# Patient Record
Sex: MALE | Race: WHITE | NOT HISPANIC OR LATINO | Employment: FULL TIME | URBAN - NONMETROPOLITAN AREA
[De-identification: names, ages, dates, MRNs, and addresses within clinical notes are randomized per-mention and may not be internally consistent; named-entity substitution may affect disease eponyms.]

---

## 2018-05-09 ENCOUNTER — APPOINTMENT (OUTPATIENT)
Dept: GENERAL RADIOLOGY | Facility: HOSPITAL | Age: 63
End: 2018-05-09

## 2018-05-09 ENCOUNTER — HOSPITAL ENCOUNTER (EMERGENCY)
Facility: HOSPITAL | Age: 63
Discharge: HOME | End: 2018-05-09
Attending: EMERGENCY MEDICINE | Admitting: EMERGENCY MEDICINE

## 2018-05-09 VITALS
BODY MASS INDEX: 32.93 KG/M2 | HEART RATE: 67 BPM | RESPIRATION RATE: 18 BRPM | SYSTOLIC BLOOD PRESSURE: 151 MMHG | HEIGHT: 70 IN | WEIGHT: 230 LBS | OXYGEN SATURATION: 96 % | DIASTOLIC BLOOD PRESSURE: 74 MMHG | TEMPERATURE: 98.2 F

## 2018-05-09 DIAGNOSIS — R07.9 CHEST PAIN, UNSPECIFIED TYPE: Primary | ICD-10-CM

## 2018-05-09 LAB
ALBUMIN SERPL-MCNC: 4.8 G/DL (ref 3.5–5)
ALBUMIN/GLOB SERPL: 1.4 G/DL (ref 1–2)
ALP SERPL-CCNC: 94 U/L (ref 38–126)
ALT SERPL W P-5'-P-CCNC: 53 U/L (ref 13–69)
ANION GAP SERPL CALCULATED.3IONS-SCNC: 18.4 MMOL/L (ref 10–20)
AST SERPL-CCNC: 33 U/L (ref 15–46)
BASOPHILS # BLD AUTO: 0.04 10*3/MM3 (ref 0–0.2)
BASOPHILS NFR BLD AUTO: 0.4 % (ref 0–2.5)
BILIRUB SERPL-MCNC: 0.5 MG/DL (ref 0.2–1.3)
BUN BLD-MCNC: 14 MG/DL (ref 7–20)
BUN/CREAT SERPL: 15.6 (ref 6.3–21.9)
CALCIUM SPEC-SCNC: 9.4 MG/DL (ref 8.4–10.2)
CHLORIDE SERPL-SCNC: 105 MMOL/L (ref 98–107)
CO2 SERPL-SCNC: 25 MMOL/L (ref 26–30)
CREAT BLD-MCNC: 0.9 MG/DL (ref 0.6–1.3)
D-DIMER, QUANTITATIVE (MAD,POW, STR): 482 NG/ML (FEU) (ref 0–500)
DEPRECATED RDW RBC AUTO: 42.8 FL (ref 37–54)
EOSINOPHIL # BLD AUTO: 0.16 10*3/MM3 (ref 0–0.7)
EOSINOPHIL NFR BLD AUTO: 1.7 % (ref 0–7)
ERYTHROCYTE [DISTWIDTH] IN BLOOD BY AUTOMATED COUNT: 13 % (ref 11.5–14.5)
GFR SERPL CREATININE-BSD FRML MDRD: 85 ML/MIN/1.73
GLOBULIN UR ELPH-MCNC: 3.4 GM/DL
GLUCOSE BLD-MCNC: 124 MG/DL (ref 74–98)
HCT VFR BLD AUTO: 45.2 % (ref 42–52)
HGB BLD-MCNC: 15.4 G/DL (ref 14–18)
HOLD SPECIMEN: NORMAL
HOLD SPECIMEN: NORMAL
IMM GRANULOCYTES # BLD: 0.05 10*3/MM3 (ref 0–0.06)
IMM GRANULOCYTES NFR BLD: 0.5 % (ref 0–0.6)
LIPASE SERPL-CCNC: 99 U/L (ref 23–300)
LYMPHOCYTES # BLD AUTO: 2.4 10*3/MM3 (ref 0.6–3.4)
LYMPHOCYTES NFR BLD AUTO: 26.1 % (ref 10–50)
MCH RBC QN AUTO: 30.9 PG (ref 27–31)
MCHC RBC AUTO-ENTMCNC: 34.1 G/DL (ref 30–37)
MCV RBC AUTO: 90.6 FL (ref 80–94)
MONOCYTES # BLD AUTO: 0.83 10*3/MM3 (ref 0–0.9)
MONOCYTES NFR BLD AUTO: 9 % (ref 0–12)
NEUTROPHILS # BLD AUTO: 5.73 10*3/MM3 (ref 2–6.9)
NEUTROPHILS NFR BLD AUTO: 62.3 % (ref 37–80)
NRBC BLD MANUAL-RTO: 0 /100 WBC (ref 0–0)
PLATELET # BLD AUTO: 208 10*3/MM3 (ref 130–400)
PMV BLD AUTO: 10.3 FL (ref 6–12)
POTASSIUM BLD-SCNC: 3.4 MMOL/L (ref 3.5–5.1)
PROT SERPL-MCNC: 8.2 G/DL (ref 6.3–8.2)
RBC # BLD AUTO: 4.99 10*6/MM3 (ref 4.7–6.1)
SODIUM BLD-SCNC: 145 MMOL/L (ref 137–145)
TROPONIN I SERPL-MCNC: 0.02 NG/ML (ref 0–0.03)
TROPONIN I SERPL-MCNC: <0.012 NG/ML (ref 0–0.03)
WBC NRBC COR # BLD: 9.21 10*3/MM3 (ref 4.8–10.8)
WHOLE BLOOD HOLD SPECIMEN: NORMAL
WHOLE BLOOD HOLD SPECIMEN: NORMAL

## 2018-05-09 PROCEDURE — 93005 ELECTROCARDIOGRAM TRACING: CPT | Performed by: EMERGENCY MEDICINE

## 2018-05-09 PROCEDURE — 83690 ASSAY OF LIPASE: CPT | Performed by: EMERGENCY MEDICINE

## 2018-05-09 PROCEDURE — 84484 ASSAY OF TROPONIN QUANT: CPT | Performed by: EMERGENCY MEDICINE

## 2018-05-09 PROCEDURE — 93005 ELECTROCARDIOGRAM TRACING: CPT

## 2018-05-09 PROCEDURE — 80053 COMPREHEN METABOLIC PANEL: CPT | Performed by: EMERGENCY MEDICINE

## 2018-05-09 PROCEDURE — 71045 X-RAY EXAM CHEST 1 VIEW: CPT

## 2018-05-09 PROCEDURE — 85025 COMPLETE CBC W/AUTO DIFF WBC: CPT | Performed by: EMERGENCY MEDICINE

## 2018-05-09 PROCEDURE — 85379 FIBRIN DEGRADATION QUANT: CPT | Performed by: EMERGENCY MEDICINE

## 2018-05-09 PROCEDURE — 99284 EMERGENCY DEPT VISIT MOD MDM: CPT

## 2018-05-09 RX ORDER — SODIUM CHLORIDE 0.9 % (FLUSH) 0.9 %
10 SYRINGE (ML) INJECTION AS NEEDED
Status: DISCONTINUED | OUTPATIENT
Start: 2018-05-09 | End: 2018-05-09 | Stop reason: HOSPADM

## 2018-05-09 NOTE — ED PROVIDER NOTES
Subjective   62-year-old male presents emergency Department with 2 week history of intermittent nausea and right chest tightness.  Patient states that he has a history of hypertension was started on a new blood pressure medication.  Patient states that he usually gets these symptoms after starting the medication.  Patient's wife states that he's had his symptoms intermittently throughout the day.  She does not believe there is a correlation between taking the medication and symptom onset.  Patient has followed up with his primary care physician and was told that it's he had a heart murmur and would need follow-up with a cardiologist.  Patient denies any cardiac history.  He denies a history of coronary artery disease.  Patient has not had a stress test. Hx of hypertension and hyperlipidemia.         History provided by:  Patient   used: No    Chest Pain   Pain location:  R chest  Pain quality: pressure    Pain radiates to:  Does not radiate  Pain severity:  Mild  Onset quality:  Gradual  Duration:  2 weeks  Timing:  Intermittent  Progression:  Waxing and waning  Chronicity:  New  Context: at rest    Relieved by:  Nothing  Worsened by:  Nothing  Ineffective treatments:  None tried  Associated symptoms: no abdominal pain, no back pain, no cough, no dizziness, no dysphagia, no fever, no nausea, no numbness, no palpitations, no shortness of breath, no vomiting and no weakness    Risk factors: high cholesterol and hypertension    Risk factors: no coronary artery disease and no diabetes mellitus        Review of Systems   Constitutional: Negative for chills and fever.   HENT: Negative for congestion, rhinorrhea, sore throat and trouble swallowing.    Eyes: Negative for discharge and visual disturbance.   Respiratory: Negative for cough, chest tightness, shortness of breath and wheezing.    Cardiovascular: Positive for chest pain. Negative for palpitations and leg swelling.   Gastrointestinal: Negative  for abdominal pain, constipation, diarrhea, nausea and vomiting.   Genitourinary: Negative for dysuria, flank pain and hematuria.   Musculoskeletal: Negative for back pain, myalgias and neck pain.   Skin: Negative for color change and rash.   Neurological: Negative for dizziness, weakness and numbness.   Psychiatric/Behavioral: Negative for self-injury and suicidal ideas.       Past Medical History:   Diagnosis Date   • Cancer    • Hyperlipidemia    • Hypertension        No Known Allergies    Past Surgical History:   Procedure Laterality Date   • PROSTATECTOMY         History reviewed. No pertinent family history.    Social History     Social History   • Marital status:      Social History Main Topics   • Smoking status: Never Smoker   • Alcohol use No   • Drug use: No     Other Topics Concern   • Not on file           Objective   Physical Exam   Constitutional: He is oriented to person, place, and time. He appears well-developed and well-nourished.   HENT:   Head: Normocephalic and atraumatic.   Nose: Nose normal.   Mouth/Throat: Oropharynx is clear and moist.   Eyes: Conjunctivae and EOM are normal. Pupils are equal, round, and reactive to light.   Neck: Normal range of motion. Neck supple.   Cardiovascular: Normal rate, regular rhythm, normal heart sounds and intact distal pulses.    Pulmonary/Chest: Effort normal and breath sounds normal. No respiratory distress. He has no wheezes. He exhibits no tenderness.   Abdominal: Soft. Bowel sounds are normal. There is no tenderness. There is no rebound and no guarding.   Musculoskeletal: Normal range of motion. He exhibits no edema, tenderness or deformity.   Neurological: He is alert and oriented to person, place, and time. No cranial nerve deficit. Coordination normal.   Skin: Skin is warm and dry. No rash noted. No erythema. No pallor.   Psychiatric: He has a normal mood and affect. His behavior is normal. Judgment and thought content normal.   Nursing note  and vitals reviewed.      Procedures           ED Course  ED Course                  MDM  Number of Diagnoses or Management Options  Chest pain, unspecified type:   Diagnosis management comments: EKG: Ventricular rate 86, WV interval 186, QTc 449, QRS duration 138, sinus rhythm, right bundle branch block, ST depression  EKG: Ventricular rate 70, WV interval 186, QTc 466, castration 138, sinus rhythm.  Right bundle branch block.    Troponin, EKG and d-dimer normal. CXR does not show an acute process. Discussed results with the patient. His family want him admitted to see a cardiologist as his PCP has recommended that he see one. Currently asymptomatic.     Spoke with Dr. Rubio, cardiology. He feels the patient can be discharged home and follow up in the office. He will arrange an appointment and have the patient get a stress test during that time. I discussed plan of care with the patient and family. They're not happy the patient is being discharge home. Earliest appointment with Dr. Rubio is 5/17/18.  I explained to them to return if the patient begins having chest pain again and to keep the appointment scheduled with Dr. Rubio.        Amount and/or Complexity of Data Reviewed  Clinical lab tests: reviewed  Tests in the radiology section of CPT®: reviewed          Final diagnoses:   Chest pain, unspecified type            Svitlana Sadler MD  05/09/18 [id number]

## 2018-07-17 ENCOUNTER — TRANSCRIBE ORDERS (OUTPATIENT)
Dept: ADMINISTRATIVE | Facility: HOSPITAL | Age: 63
End: 2018-07-17

## 2018-07-17 ENCOUNTER — APPOINTMENT (OUTPATIENT)
Dept: LAB | Facility: HOSPITAL | Age: 63
End: 2018-07-17

## 2018-07-17 DIAGNOSIS — M79.675 GREAT TOE PAIN, LEFT: Primary | ICD-10-CM

## 2018-07-17 LAB
BASOPHILS # BLD AUTO: 0.04 10*3/MM3 (ref 0–0.2)
BASOPHILS NFR BLD AUTO: 0.6 % (ref 0–2.5)
DEPRECATED RDW RBC AUTO: 41.6 FL (ref 37–54)
EOSINOPHIL # BLD AUTO: 0.18 10*3/MM3 (ref 0–0.7)
EOSINOPHIL NFR BLD AUTO: 2.6 % (ref 0–7)
ERYTHROCYTE [DISTWIDTH] IN BLOOD BY AUTOMATED COUNT: 12.7 % (ref 11.5–14.5)
HCT VFR BLD AUTO: 42.6 % (ref 42–52)
HGB BLD-MCNC: 14.4 G/DL (ref 14–18)
IMM GRANULOCYTES # BLD: 0.02 10*3/MM3 (ref 0–0.06)
IMM GRANULOCYTES NFR BLD: 0.3 % (ref 0–0.6)
LYMPHOCYTES # BLD AUTO: 1.89 10*3/MM3 (ref 0.6–3.4)
LYMPHOCYTES NFR BLD AUTO: 26.8 % (ref 10–50)
MCH RBC QN AUTO: 30.8 PG (ref 27–31)
MCHC RBC AUTO-ENTMCNC: 33.8 G/DL (ref 30–37)
MCV RBC AUTO: 91.2 FL (ref 80–94)
MONOCYTES # BLD AUTO: 0.69 10*3/MM3 (ref 0–0.9)
MONOCYTES NFR BLD AUTO: 9.8 % (ref 0–12)
NEUTROPHILS # BLD AUTO: 4.23 10*3/MM3 (ref 2–6.9)
NEUTROPHILS NFR BLD AUTO: 59.9 % (ref 37–80)
NRBC BLD MANUAL-RTO: 0 /100 WBC (ref 0–0)
PLATELET # BLD AUTO: 227 10*3/MM3 (ref 130–400)
PMV BLD AUTO: 10.4 FL (ref 6–12)
RBC # BLD AUTO: 4.67 10*6/MM3 (ref 4.7–6.1)
URATE SERPL-MCNC: 5.7 MG/DL (ref 2.5–8.5)
WBC NRBC COR # BLD: 7.05 10*3/MM3 (ref 4.8–10.8)

## 2018-07-17 PROCEDURE — 84550 ASSAY OF BLOOD/URIC ACID: CPT | Performed by: NURSE PRACTITIONER

## 2018-07-17 PROCEDURE — 85025 COMPLETE CBC W/AUTO DIFF WBC: CPT | Performed by: NURSE PRACTITIONER

## 2018-07-17 PROCEDURE — 36415 COLL VENOUS BLD VENIPUNCTURE: CPT | Performed by: NURSE PRACTITIONER

## 2018-12-05 DIAGNOSIS — M25.572 ARTHRALGIA OF LEFT FOOT: Primary | ICD-10-CM

## 2018-12-06 ENCOUNTER — OFFICE VISIT (OUTPATIENT)
Dept: ORTHOPEDIC SURGERY | Facility: CLINIC | Age: 63
End: 2018-12-06

## 2018-12-06 VITALS — WEIGHT: 230 LBS | BODY MASS INDEX: 32.93 KG/M2 | RESPIRATION RATE: 18 BRPM | HEIGHT: 70 IN

## 2018-12-06 DIAGNOSIS — M19.079 ARTHRITIS OF FOOT: ICD-10-CM

## 2018-12-06 DIAGNOSIS — M77.8 CAPSULITIS OF FOOT, LEFT: ICD-10-CM

## 2018-12-06 DIAGNOSIS — M25.572 ARTHRALGIA OF LEFT FOOT: Primary | ICD-10-CM

## 2018-12-06 PROCEDURE — 99203 OFFICE O/P NEW LOW 30 MIN: CPT | Performed by: PODIATRIST

## 2018-12-06 RX ORDER — ZOSTER VACCINE RECOMBINANT, ADJUVANTED 50 MCG/0.5
KIT INTRAMUSCULAR
Refills: 1 | COMMUNITY
Start: 2018-09-26

## 2018-12-06 RX ORDER — MELOXICAM 7.5 MG/1
7.5 TABLET ORAL DAILY
Qty: 30 TABLET | Refills: 0 | Status: SHIPPED | OUTPATIENT
Start: 2018-12-06 | End: 2018-12-20

## 2018-12-06 NOTE — PROGRESS NOTES
Subjective   Patient ID: Nikhil Mccrary is a 63 y.o. male   Pain of the Left Foot  Patient presents today with a chief complaint of left foot pain.  He states over the last few years he's noticed this slightly worsening and it seems as if the skin on the bottom of his feet is not attached at times.  He states his feet feel tingly at times.  Says he has pain in the bottom of the foot and he was concerned he may have a mass or tumor.  States he also was concerned about the skin discoloration in his legs.  Says he's had issues with swelling and blood pressure medications but overall has no other health issues that he is aware of.  States he has been tested multiple times for diabetes.    History of Present Illness    Pain Score: 5  Pain Location: Foot  Pain Orientation: Left     Pain Descriptors: Sore, Aching  Pain Frequency: Intermittent  Pain Onset: Ongoing     Clinical Progression: Gradually worsening  Aggravating Factors: Walking        Pain Intervention(s): Rest  Result of Injury: No(ongoing several years)       Review of Systems   Constitutional: Negative.    Cardiovascular: Positive for leg swelling.   Musculoskeletal: Positive for arthralgias (linda foot, left greater than right).   Skin: Negative.    Neurological: Negative.        Past Medical History:   Diagnosis Date   • Cancer (CMS/HCC)    • Hyperlipidemia    • Hypertension         Past Surgical History:   Procedure Laterality Date   • PROSTATECTOMY         No Known Allergies      Current Outpatient Medications:   •  diclofenac (VOLTAREN) 1 % gel gel, Apply 4 g topically to the appropriate area as directed 4 (Four) Times a Day., Disp: 100 g, Rfl: 1  •  meloxicam (MOBIC) 7.5 MG tablet, Take 1 tablet by mouth Daily., Disp: 30 tablet, Rfl: 0  •  SHINGRIX 50 MCG/0.5ML reconstituted suspension, ADMINISTER VACCINE PER PHYSICIAN PROTOCOL, Disp: , Rfl: 1    Family History   Problem Relation Age of Onset   • Hypertension Mother    • Diabetes Maternal Grandmother         Social History     Socioeconomic History   • Marital status:      Spouse name: Not on file   • Number of children: Not on file   • Years of education: Not on file   • Highest education level: Not on file   Social Needs   • Financial resource strain: Not on file   • Food insecurity - worry: Not on file   • Food insecurity - inability: Not on file   • Transportation needs - medical: Not on file   • Transportation needs - non-medical: Not on file   Occupational History   • Not on file   Tobacco Use   • Smoking status: Never Smoker   • Smokeless tobacco: Never Used   Substance and Sexual Activity   • Alcohol use: No   • Drug use: No   • Sexual activity: Defer   Other Topics Concern   • Not on file   Social History Narrative   • Not on file       Counseling given: No       I have reviewed all of the above social hx, family hx, surgical hx, medications, allergies & ROS and confirm that it is accurate.  Objective   Physical Exam   Constitutional: He is oriented to person, place, and time. He appears well-developed and well-nourished.   HENT:   Head: Normocephalic and atraumatic.   Nose: Nose normal.   Eyes: Conjunctivae and EOM are normal. Pupils are equal, round, and reactive to light.   Neck: Normal range of motion.   Cardiovascular: Normal rate.   Pulmonary/Chest: Effort normal.   Neurological: He is alert and oriented to person, place, and time.   Skin: Skin is warm.   Psychiatric: He has a normal mood and affect. His behavior is normal. Judgment and thought content normal.   Nursing note and vitals reviewed.    Ortho Exam left  Lower extremity exam:  Vascular: Pulses palpable, pedal hair noted, CFT brisk,   minimaledema noted  Neuro: Gross sensation intact  Derm: Normal turgor and temperature, no wounds or sores or lesions.  He does have hyperpigmentation of both lower extremities from the mid tibial level distally.    MSK: Joint range of motion normal, manual muscle testing normal, he is tender to  palpation to the plantar aspect of the second MTPJ.  There is some fullness noted here along with swelling.  I am unable to elicit pain with attempted Argenis's test/Argenis's click.  He is less tender in the second interspace than at the plantar aspect of the joint.  Second MTPJ Lachman test is negative but there is pain with attempted maneuver.      Assessment/Plan left foot second metatarsophalangeal joint capsulitis and less likely second interspace neuroma, bilateral lower extremity dermopathy  Independent Review of Radiographic Studies:      Laboratory and Other Studies:     Medical Decision Making:        Procedures  [] No procedures were performed in office today.    There are no diagnoses linked to this encounter.      Recommendations/Plan:  I reviewed his x-rays with him and discussed the possibilities and differences in neuroma versus capsulitis.  Based on clinical exam I feel this is more likely capsulitis, especially given his x-rays.  I recommend once a day oral anti-inflammatory and topical anti-inflammatory which would be prescribed his pharmacy.  I also recommend a metatarsal pad and instructed him on that.  I recommend we try the simple treatment options first and if there is no improvement will consider injection as well as MRI if needed.  I explained the fullness he's feeling is most likely due to swelling of the joint but there is a possibility of underlying mass but this is much less likely.  We discussed his skin discoloration as well and discussed the different causes of this.  He has none of the typical health issues associated with this but it could very well be due to swelling or medications.  I had him follow back up in about 3 or 4 weeks, sooner if needed.        No Follow-up on file.  Patient agreeable to call or return sooner for any concerns.

## 2018-12-20 ENCOUNTER — OFFICE VISIT (OUTPATIENT)
Dept: ORTHOPEDIC SURGERY | Facility: CLINIC | Age: 63
End: 2018-12-20

## 2018-12-20 VITALS — RESPIRATION RATE: 18 BRPM | BODY MASS INDEX: 33.21 KG/M2 | HEIGHT: 70 IN | WEIGHT: 232 LBS

## 2018-12-20 DIAGNOSIS — M77.8 CAPSULITIS OF FOOT, LEFT: Primary | ICD-10-CM

## 2018-12-20 DIAGNOSIS — M19.079 ARTHRITIS OF FOOT: ICD-10-CM

## 2018-12-20 DIAGNOSIS — M25.572 ARTHRALGIA OF LEFT FOOT: ICD-10-CM

## 2018-12-20 PROCEDURE — 20600 DRAIN/INJ JOINT/BURSA W/O US: CPT | Performed by: PODIATRIST

## 2018-12-20 PROCEDURE — 99213 OFFICE O/P EST LOW 20 MIN: CPT | Performed by: PODIATRIST

## 2018-12-20 RX ORDER — DOXAZOSIN MESYLATE 1 MG/1
1 TABLET ORAL NIGHTLY
COMMUNITY

## 2018-12-20 RX ORDER — LORATADINE 10 MG/1
CAPSULE, LIQUID FILLED ORAL
COMMUNITY

## 2018-12-20 RX ORDER — SPIRONOLACTONE 100 MG/1
100 TABLET, FILM COATED ORAL DAILY
COMMUNITY

## 2018-12-20 RX ORDER — BUPIVACAINE HYDROCHLORIDE 2.5 MG/ML
0.25 INJECTION, SOLUTION INFILTRATION; PERINEURAL
Status: COMPLETED | OUTPATIENT
Start: 2018-12-20 | End: 2018-12-20

## 2018-12-20 RX ORDER — LIDOCAINE HYDROCHLORIDE 10 MG/ML
0.25 INJECTION, SOLUTION INFILTRATION; PERINEURAL
Status: COMPLETED | OUTPATIENT
Start: 2018-12-20 | End: 2018-12-20

## 2018-12-20 RX ORDER — CHLORAL HYDRATE 500 MG
CAPSULE ORAL
COMMUNITY

## 2018-12-20 RX ORDER — TRIAMCINOLONE ACETONIDE 40 MG/ML
20 INJECTION, SUSPENSION INTRA-ARTICULAR; INTRAMUSCULAR
Status: COMPLETED | OUTPATIENT
Start: 2018-12-20 | End: 2018-12-20

## 2018-12-20 RX ORDER — ASPIRIN 81 MG/1
81 TABLET ORAL DAILY
COMMUNITY

## 2018-12-20 RX ORDER — CARVEDILOL 12.5 MG/1
12.5 TABLET ORAL 2 TIMES DAILY WITH MEALS
COMMUNITY

## 2018-12-20 RX ORDER — HYDRALAZINE HYDROCHLORIDE 10 MG/1
TABLET, FILM COATED ORAL
COMMUNITY

## 2018-12-20 RX ORDER — AMLODIPINE BESYLATE 10 MG/1
TABLET ORAL
COMMUNITY

## 2018-12-20 RX ORDER — LOVASTATIN 40 MG/1
TABLET ORAL
COMMUNITY

## 2018-12-20 RX ADMIN — BUPIVACAINE HYDROCHLORIDE 0.25 ML: 2.5 INJECTION, SOLUTION INFILTRATION; PERINEURAL at 09:07

## 2018-12-20 RX ADMIN — LIDOCAINE HYDROCHLORIDE 0.25 ML: 10 INJECTION, SOLUTION INFILTRATION; PERINEURAL at 09:07

## 2018-12-20 RX ADMIN — TRIAMCINOLONE ACETONIDE 20 MG: 40 INJECTION, SUSPENSION INTRA-ARTICULAR; INTRAMUSCULAR at 09:07

## 2018-12-20 NOTE — PROGRESS NOTES
Subjective   Patient ID: Nikhil Mccrary is a 63 y.o. male   Follow-up of the Left Foot  Comes back in today for follow-up of his left foot.  States that he's been using the oral anti-inflammatories and the gel on occasion.  States he forgets about that.  Says he try the metatarsal pad but it seemed to make it worse and he only used it for about 2 days.    History of Present Illness                                                   Review of Systems   Constitutional: Negative.    Respiratory: Negative.    Musculoskeletal: Positive for arthralgias (left foot).   Skin: Negative.        Past Medical History:   Diagnosis Date   • Cancer (CMS/HCC)    • Hyperlipidemia    • Hypertension         Past Surgical History:   Procedure Laterality Date   • PROSTATECTOMY         No Known Allergies      Current Outpatient Medications:   •  aspirin 81 MG EC tablet, Take 81 mg by mouth Daily., Disp: , Rfl:   •  carvedilol (COREG) 12.5 MG tablet, Take 12.5 mg by mouth 2 (Two) Times a Day With Meals., Disp: , Rfl:   •  Cholecalciferol (VITAMIN D3) 5000 units capsule capsule, Take 5,000 Units by mouth Daily., Disp: , Rfl:   •  doxazosin (CARDURA) 1 MG tablet, Take 1 mg by mouth Every Night., Disp: , Rfl:   •  Loratadine 10 MG capsule, Take  by mouth., Disp: , Rfl:   •  Omega-3 1000 MG capsule, Take  by mouth., Disp: , Rfl:   •  spironolactone (ALDACTONE) 100 MG tablet, Take 100 mg by mouth Daily., Disp: , Rfl:   •  amLODIPine (NORVASC) 10 MG tablet, amlodipine 10 mg tablet, Disp: , Rfl:   •  hydrALAZINE (APRESOLINE) 10 MG tablet, hydralazine 10 mg tablet, Disp: , Rfl:   •  lovastatin (MEVACOR) 40 MG tablet, lovastatin 40 mg tablet  Daily, Disp: , Rfl:   •  SHINGRIX 50 MCG/0.5ML reconstituted suspension, ADMINISTER VACCINE PER PHYSICIAN PROTOCOL, Disp: , Rfl: 1    Family History   Problem Relation Age of Onset   • Hypertension Mother    • Diabetes Maternal Grandmother        Social History     Socioeconomic History   • Marital status:       Spouse name: Not on file   • Number of children: Not on file   • Years of education: Not on file   • Highest education level: Not on file   Social Needs   • Financial resource strain: Not on file   • Food insecurity - worry: Not on file   • Food insecurity - inability: Not on file   • Transportation needs - medical: Not on file   • Transportation needs - non-medical: Not on file   Occupational History   • Not on file   Tobacco Use   • Smoking status: Never Smoker   • Smokeless tobacco: Never Used   Substance and Sexual Activity   • Alcohol use: No   • Drug use: No   • Sexual activity: Defer   Other Topics Concern   • Not on file   Social History Narrative   • Not on file       Counseling given: No       I have reviewed all of the above social hx, family hx, surgical hx, medications, allergies & ROS and confirm that it is accurate.  Objective   Physical Exam   Constitutional: He is oriented to person, place, and time. He appears well-developed and well-nourished.   HENT:   Head: Normocephalic and atraumatic.   Nose: Nose normal.   Eyes: Conjunctivae and EOM are normal. Pupils are equal, round, and reactive to light.   Neck: Normal range of motion.   Cardiovascular: Normal rate.   Neurological: He is alert and oriented to person, place, and time.   Skin: Skin is warm.   Psychiatric: He has a normal mood and affect. His behavior is normal. Judgment and thought content normal.   Nursing note and vitals reviewed.    Ortho Exam  Ortho Exam left  Lower extremity exam:  Vascular: Pulses palpable, pedal hair noted, CFT brisk,   minimaledema noted  Neuro: Gross sensation intact  Derm: Normal turgor and temperature, no wounds or sores or lesions.  He does have hyperpigmentation of both lower extremities from the mid tibial level distally.    MSK: Joint range of motion normal, manual muscle testing normal, he is tender to palpation to the plantar aspect of the second MTPJ.  There is some fullness noted here along  with swelling.  I am unable to elicit pain with attempted Argenis's test/Argenis's click.  He is less tender in the second interspace than at the plantar aspect of the joint.  Second MTPJ Lachman test is negative but there is pain with attempted maneuver.       Assessment/Plan left foot second metatarsophalangeal joint capsulitis  Independent Review of Radiographic Studies:      Laboratory and Other Studies:     Medical Decision Making:        Small Joint Arthrocentesis: L second MTP  Consent given by: patient  Site marked: site marked  Timeout: Immediately prior to procedure a time out was called to verify the correct patient, procedure, equipment, support staff and site/side marked as required   Supporting Documentation  Indications: pain, joint swelling and diagnostic evaluation   Procedure Details  Location: second toe - L second MTP  Needle size: 25 G  Approach: dorsal  Medications administered: 0.25 mL lidocaine 1 %; 0.25 mL bupivacaine 0.25 %; 20 mg triamcinolone acetonide 40 MG/ML (Dexamthasone 10mg/ml 0.25ml NDC: 0036-3679-21 Lot: [id number] EXP: 10/31/2019)  Patient tolerance: patient tolerated the procedure well with no immediate complications        [] No procedures were performed in office today.    Nikhil was seen today for follow-up.    Diagnoses and all orders for this visit:    Capsulitis of foot, left    Arthritis of foot    Arthralgia of left foot    Other orders  -     Small Joint Arthrocentesis          Recommendations/Plan:  I think he may be applying the metatarsal pad to 4 distally and I recommend he move this further back on the insert and try this but I also want him to bring it back with him when he returns.  Continue with medications.  See how the injection does him.  Follow-up 3-4 weeks.    Return in about 4 weeks (around 1/17/2019).  Patient agreeable to call or return sooner for any concerns.

## 2024-12-31 ENCOUNTER — TELEPHONE (OUTPATIENT)
Dept: SURGERY | Facility: CLINIC | Age: 69
End: 2024-12-31
Payer: MEDICARE

## 2024-12-31 NOTE — TELEPHONE ENCOUNTER
Pt would like to be scheduled at [facility] On 02/14/25 W/ Dr. Cordoba-verified pharmacy/insurance. Thank you.

## 2024-12-31 NOTE — TELEPHONE ENCOUNTER
PRESCREENING FOR OPEN ACCESS SCHEDULING    Nikhil Mccrary, 1955  [id number]    12/31/24    If, the patient answers yes to any of the following questions the provider will be informed prior to scheduling open access for approval and documented in the chart.    [x]  Yes  [] No    1. Have you ever had a colonoscopy in the past?      When:  about 10-15 yrs ago      Where:       Polyps or other:     []  Yes  [x] No    2. Family history of colon cancer?      Relation:       Age of onset:       Do you currently have any of the following?    []  Yes  [x] No  Rectal bleeding, if so, how long?     []  Yes  [x] No  Abdominal pain, if so, how long?    []  Yes  [x] No  Constipation, if so, how long?    []  Yes  [x] No  Diarrhea, if so, how long?    []  Yes  [x] No  Weight loss, is so, how much?    [] Yes  [x] No  Small caliber stool, if so, how long?    []Yes  [x] No  Do you have Hemorroids?    []Yes  [x] No  Have you been diagnosed with Anemia?    []Yes  [x] No  Do you have difficulty swallowing?  []Yes  [x] No  Do you have a history of esophageal stricture or dilation?(If yes do not schedule with Dr Aquino)    []Yes  [x] No  Do you have acid reflux?    Have you ever had any of the following conditions?    [] Yes  [x] No  Heart attack?      When?       Last cardiac workup?     Current Cardiologist?     Blood thinners? 81 MG aspirin     [] Yes  [x] No   Lung problems, asthma or COPD?  [x] Yes  [] No  Oxygen required? Does use a Cpap machine at night       [] Yes  [x] No  Stroke?     [] Yes  [x] No  Have you ever had a reaction to anesthesia?

## 2025-01-02 RX ORDER — BISACODYL 5 MG/1
TABLET, DELAYED RELEASE ORAL
Qty: 4 TABLET | Refills: 0 | Status: SHIPPED | OUTPATIENT
Start: 2025-01-02

## 2025-01-02 RX ORDER — POLYETHYLENE GLYCOL 3350 17 G/17G
POWDER, FOR SOLUTION ORAL
Qty: 238 G | Refills: 0 | Status: SHIPPED | OUTPATIENT
Start: 2025-01-02

## 2025-01-03 ENCOUNTER — PREP FOR SURGERY (OUTPATIENT)
Dept: OTHER | Facility: HOSPITAL | Age: 70
End: 2025-01-03
Payer: MEDICARE

## 2025-01-03 DIAGNOSIS — Z12.11 SCREENING FOR COLON CANCER: Primary | ICD-10-CM

## 2025-02-12 RX ORDER — MULTIVIT WITH MINERALS/LUTEIN
1000 TABLET ORAL DAILY
COMMUNITY

## 2025-02-12 NOTE — PRE-PROCEDURE INSTRUCTIONS
PAT phone history completed with patient for upcoming procedure on 2/14/25,with Dr. Cordoba.    PAT PASS reviewed with patient and they verbalize understanding of the following:     Do not eat or drink anything after midnight the night before procedure unless otherwise instructed by physician/surgeon's office, this includes no gum, candy, mints, tobacco products or e-cigarettes.  Do not shave the area to be operated on at least 48 hours prior to procedure.  Do not wear makeup, lotion, hair products, or nail polish.  Do not wear any jewelry and remove all piercings.  Do not wear any adhesive if you wear dentures.  Do not wear contacts; bring in glasses if needed.  Only take medications on the morning of procedure as instructed by PAT nurse per anesthesia guidelines or as instructed by physician's office.  If you are on any blood thinners reach out to the physician/surgeon's office for instructions on when/if they will need to be stopped prior to procedure.  Bring in picture ID and insurance card, advanced directive copies if applicable, CPAP/BIPAP/Inhalers if indicated morning of procedure, leave any other valuables at home.  Ensure you have arranged for someone to drive you home the day of your procedure and someone to care for you at home afterwards. It is recommended that you do not drive, drink alcohol, or make any major legal decisions for at least 24 hours after your procedure is complete.  ERAS instructions given unless otherwise instructed per surgeon's orders.    Instructions given on hospital entrance and registration location.

## 2025-02-14 ENCOUNTER — ANESTHESIA (OUTPATIENT)
Dept: GASTROENTEROLOGY | Facility: HOSPITAL | Age: 70
End: 2025-02-14
Payer: MEDICARE

## 2025-02-14 ENCOUNTER — HOSPITAL ENCOUNTER (OUTPATIENT)
Facility: HOSPITAL | Age: 70
Setting detail: HOSPITAL OUTPATIENT SURGERY
Discharge: HOME | End: 2025-02-14
Attending: SURGERY | Admitting: SURGERY
Payer: MEDICARE

## 2025-02-14 ENCOUNTER — ANESTHESIA EVENT (OUTPATIENT)
Dept: GASTROENTEROLOGY | Facility: HOSPITAL | Age: 70
End: 2025-02-14
Payer: MEDICARE

## 2025-02-14 VITALS
HEART RATE: 51 BPM | DIASTOLIC BLOOD PRESSURE: 88 MMHG | OXYGEN SATURATION: 92 % | WEIGHT: 215 LBS | RESPIRATION RATE: 16 BRPM | TEMPERATURE: 99.2 F | HEIGHT: 70 IN | SYSTOLIC BLOOD PRESSURE: 147 MMHG | BODY MASS INDEX: 30.78 KG/M2

## 2025-02-14 DIAGNOSIS — Z12.11 SCREENING FOR COLON CANCER: ICD-10-CM

## 2025-02-14 PROCEDURE — 45384 COLONOSCOPY W/LESION REMOVAL: CPT | Performed by: SURGERY

## 2025-02-14 PROCEDURE — 25810000003 LACTATED RINGERS PER 1000 ML: Performed by: SURGERY

## 2025-02-14 PROCEDURE — 25010000002 LIDOCAINE (CARDIAC): Performed by: NURSE ANESTHETIST, CERTIFIED REGISTERED

## 2025-02-14 PROCEDURE — 25010000002 PROPOFOL 10 MG/ML EMULSION: Performed by: NURSE ANESTHETIST, CERTIFIED REGISTERED

## 2025-02-14 PROCEDURE — S0260 H&P FOR SURGERY: HCPCS | Performed by: SURGERY

## 2025-02-14 RX ORDER — ONDANSETRON 2 MG/ML
4 INJECTION INTRAMUSCULAR; INTRAVENOUS ONCE AS NEEDED
Status: DISCONTINUED | OUTPATIENT
Start: 2025-02-14 | End: 2025-02-14 | Stop reason: HOSPADM

## 2025-02-14 RX ORDER — PROPOFOL 10 MG/ML
VIAL (ML) INTRAVENOUS AS NEEDED
Status: DISCONTINUED | OUTPATIENT
Start: 2025-02-14 | End: 2025-02-14 | Stop reason: SURG

## 2025-02-14 RX ORDER — SODIUM CHLORIDE 0.9 % (FLUSH) 0.9 %
10 SYRINGE (ML) INJECTION AS NEEDED
Status: DISCONTINUED | OUTPATIENT
Start: 2025-02-14 | End: 2025-02-14 | Stop reason: HOSPADM

## 2025-02-14 RX ORDER — SODIUM CHLORIDE, SODIUM LACTATE, POTASSIUM CHLORIDE, CALCIUM CHLORIDE 600; 310; 30; 20 MG/100ML; MG/100ML; MG/100ML; MG/100ML
1000 INJECTION, SOLUTION INTRAVENOUS CONTINUOUS
Status: DISCONTINUED | OUTPATIENT
Start: 2025-02-14 | End: 2025-02-14 | Stop reason: HOSPADM

## 2025-02-14 RX ADMIN — PROPOFOL 150 MCG/KG/MIN: 10 INJECTION, EMULSION INTRAVENOUS at 08:38

## 2025-02-14 RX ADMIN — PROPOFOL 70 MG: 10 INJECTION, EMULSION INTRAVENOUS at 08:37

## 2025-02-14 RX ADMIN — SODIUM CHLORIDE, SODIUM LACTATE, POTASSIUM CHLORIDE, CALCIUM CHLORIDE 1000 ML: 20; 30; 600; 310 INJECTION, SOLUTION INTRAVENOUS at 08:25

## 2025-02-14 RX ADMIN — LIDOCAINE HYDROCHLORIDE 60 MG: 20 INJECTION, SOLUTION INTRAVENOUS at 08:37

## 2025-02-14 NOTE — ANESTHESIA POSTPROCEDURE EVALUATION
Patient: Nikhil Mccrary    Procedure Summary       Date: 02/14/25 Room / Location: [facility] ENDOSCOPY 3 / [facility] ENDOSCOPY    Anesthesia Start: 0833 Anesthesia Stop: 0853    Procedure: COLONOSCOPY WITH HOT POLYPECTOMY X1 Diagnosis:       Screening for colon cancer      (Screening for colon cancer [Z12.11])    Surgeons: Wellington Cordoba MD Provider: Markel Cheek CRNA    Anesthesia Type: MAC ASA Status: 2            Anesthesia Type: MAC    Vitals  No vitals data found for the desired time range.          Post Anesthesia Care and Evaluation    Patient location during evaluation: bedside  Patient participation: complete - patient participated  Level of consciousness: awake and alert  Pain score: 0  Pain management: satisfactory to patient    Airway patency: patent  Anesthetic complications: No anesthetic complications  PONV Status: none  Cardiovascular status: acceptable and stable  Respiratory status: acceptable  Hydration status: acceptable    Comments: Vitals signs as noted in nursing documentation as per protocol.

## 2025-02-14 NOTE — H&P
[facility]   HISTORY AND PHYSICAL      Name:  Nikhil Mccrary   Age:  69 y.o.  Sex:  male  :  1955  MRN:  [id number]   Visit Number:  [id number]  Admission Date:  2025  Date Of Service:  25  Primary Care Physician:  Samreen Paige MD    Chief Complaint:     I need a colonoscopy    History Of Presenting Illness:      69-year-old male comes in for routine colonoscopy, his last colonoscopy was 15 years ago.  He had no family Struve colon cancer no history of polyps.    Review Of Systems:     General ROS: Patient denies any fevers, chills or loss of consciousness.  No complaints of generalized weakness  Psychological ROS: Denies any hallucinations and delusions.  Respiratory ROS: Denies cough or shortness of breath.   Cardiovascular ROS: Denies chest pain or palpitations. No history of exertional chest pain.   Gastrointestinal ROS: Denies nausea and vomiting. Denies any abdominal pain. No diarrhea.   Genito-Urinary ROS: Denies dysuria or hematuria.  Neurological ROS: Denies any focal weakness. No loss of consciousness. Denies any numbness.   Dermatological ROS: Denies any redness or pruritis.     Past Medical History:    Past Medical History:   Diagnosis Date    Cancer     Elevated cholesterol     Hyperlipidemia     Hypertension     Sleep apnea        Past Surgical history:    Past Surgical History:   Procedure Laterality Date    COLONOSCOPY      PROSTATECTOMY         Social History:    Social History     Socioeconomic History    Marital status:    Tobacco Use    Smoking status: Never    Smokeless tobacco: Never   Vaping Use    Vaping status: Never Used   Substance and Sexual Activity    Alcohol use: No    Drug use: No    Sexual activity: Defer       Family History:    Family History   Problem Relation Age of Onset    Hypertension Mother     Diabetes Maternal Grandmother        Allergies:      Patient has no known allergies.    Home Medications:    Prior to  Admission Medications       Prescriptions Last Dose Informant Patient Reported? Taking?    amLODIPine (NORVASC) 10 MG tablet 2/14/2025 Self Yes Yes    Take 1 tablet by mouth Daily.    aspirin 81 MG EC tablet 2/8/2025 Self Yes Yes    Take 1 tablet by mouth Daily.    bisacodyl 5 MG EC tablet 2/13/2025  No Yes    4 tablets to be taken at time directed on instructions the day prior to colonoscopy    carvedilol (COREG) 12.5 MG tablet 2/14/2025 Self Yes Yes    Take 3.125 mg by mouth 2 (Two) Times a Day With Meals.    Cholecalciferol (VITAMIN D3) 5000 units capsule capsule 2/13/2025 Self Yes Yes    Take 1 capsule by mouth Daily.    doxazosin (CARDURA) 1 MG tablet 2/13/2025 Self Yes Yes    Take 1 tablet by mouth Every Night.    Loratadine 10 MG capsule 2/13/2025 Self Yes Yes    Take  by mouth Daily.    lovastatin (MEVACOR) 40 MG tablet 2/14/2025 Self Yes Yes    Take 0.5 tablets by mouth Every Night.    Omega-3 1000 MG capsule 2/13/2025 Self Yes Yes    Take 1 capsule by mouth Daily.    polyethylene glycol (MIRALAX) 17 GM/SCOOP powder 2/13/2025  No Yes    Mix 238g powder with 64 oz of clear liquid. Use as directed.    spironolactone (ALDACTONE) 100 MG tablet 2/13/2025 Self Yes Yes    Take 25 mg by mouth Daily.    vitamin C (ASCORBIC ACID) 250 MG tablet 2/13/2025 Self Yes Yes    Take 4 tablets by mouth Daily.    hydrALAZINE (APRESOLINE) 10 MG tablet Not Taking  Yes No    hydralazine 10 mg tablet    Patient not taking:  Reported on 2/12/2025               ED Medications:    Medications   lactated ringers infusion 1,000 mL (1,000 mL Intravenous New Bag 2/14/25 7052)       Vital Signs:    Temp:  [97.6 °F (36.4 °C)] 97.6 °F (36.4 °C)  Heart Rate:  [57] 57  Resp:  [18] 18  BP: (158)/(77) 158/77        02/12/25  0956   Weight: 97.5 kg (215 lb)       Body mass index is 30.85 kg/m².    Physical Exam:      General Appearance:  Alert and cooperative, not in any acute distress.   Head:  Atraumatic and normocephalic, without obvious  abnormality.   Eyes:          PERRLA, conjunctivae and sclerae normal, no Icterus. No pallor. Extra-occular movements are within normal limits.   Ears:  Ears appear intact with no abnormalities noted.   Respiratory/Lungs:   Breath sounds heard bilaterally equally.  No crackles or wheezing. No Pleural rub or bronchial breathing. Normal respiratory effort.    Cardiovascular/Heart:  Normal S1 and S2,    GI/Abdomen:   No masses, no hepatosplenomegaly. Soft, non-tender, non-distended, no hernia                 Musculoskeletal/ Extremities:   Moves all extremities well   Skin: No bleeding, bruising or rash, no induration   Psychiatric : Alert and oriented ×3.  No depression or anxiety    Neurologic: Cranial nerves 2 - 12 grossly intact, sensation intact, Motor power is normal and equal bilaterally.       EKG:          Labs:    Lab Results (last 24 hours)       ** No results found for the last 24 hours. **            Radiology:    Imaging Results (Last 72 Hours)       ** No results found for the last 72 hours. **            Assessment:    Screening colonoscopy     Plan:     I recommend a screening colonoscopy to the patient.  The patient understands the procedure and the reason for the procedure.  The patient also understands the risks which include but are not limited to bleeding and perforation and they understand the ramifications of these potential complications including operative intervention and wish to proceed.    Wellington Cordoba MD  02/14/25  08:34 EST

## 2025-02-14 NOTE — ANESTHESIA PREPROCEDURE EVALUATION
Anesthesia Evaluation     Patient summary reviewed and Nursing notes reviewed   no history of anesthetic complications:   NPO Solid Status: > 8 hours  NPO Liquid Status: > 8 hours           Airway   Mallampati: II  TM distance: >3 FB  Neck ROM: full  no difficulty expected and Possible difficult intubation  Dental - normal exam     Pulmonary - negative pulmonary ROS and normal exam   Cardiovascular - normal exam    (+) hypertension 2 medications or greater, hyperlipidemia      Neuro/Psych- negative ROS  GI/Hepatic/Renal/Endo - negative ROS     Musculoskeletal (-) negative ROS    Abdominal    Substance History - negative use     OB/GYN negative ob/gyn ROS         Other      history of cancer    ROS/Med Hx Other: Hx prostate cancer.              Anesthesia Plan    ASA 2     MAC     (Pt told that intravenous sedation will be used as the primary anesthetic along with local anesthesia if necessary. Every effort will be made to make sure the patient is comfortable.     The patient was told they may or may not have recall for the procedure. It was further explained that if the MAC was not adequate that a general anesthetic with either an LMA or endotracheal tube would be required.     Will proceed with the plan of care.)  intravenous induction     Anesthetic plan, risks, benefits, and alternatives have been provided, discussed and informed consent has been obtained with: patient.    CODE STATUS:

## 2025-02-17 LAB — REF LAB TEST METHOD: NORMAL

## (undated) DEVICE — VLV SXN AIR/H2O ORCAPOD3 1P/U STRL

## (undated) DEVICE — PATIENT RETURN ELECTRODE, SINGLE-USE, CONTACT QUALITY MONITORING, ADULT, WITH 9FT CORD, FOR PATIENTS WEIGING OVER 33LBS. (15KG): Brand: MEGADYNE

## (undated) DEVICE — HYBRID CO2 TUBING/CAP SET FOR OLYMPUS® SCOPES & CO2 SOURCE: Brand: ERBE

## (undated) DEVICE — GLV SURG SENSICARE W/ALOE PF LF 7.5 STRL

## (undated) DEVICE — MEDI-VAC NON-CONDUCTIVE SUCTION TUBING: Brand: CARDINAL HEALTH

## (undated) DEVICE — ENDOSCOPY PORT CONNECTOR FOR OLYMPUS® SCOPES: Brand: ERBE

## (undated) DEVICE — FRCP BIOP RADLJAW4 HOT 2.2X240 BX40

## (undated) DEVICE — Device

## (undated) DEVICE — LUBE JELLY PK/2.75GM STRL BX/144